# Patient Record
Sex: FEMALE | Race: WHITE | NOT HISPANIC OR LATINO | Employment: FULL TIME
[De-identification: names, ages, dates, MRNs, and addresses within clinical notes are randomized per-mention and may not be internally consistent; named-entity substitution may affect disease eponyms.]

---

## 2017-08-25 ENCOUNTER — RECORDS - HEALTHEAST (OUTPATIENT)
Dept: ADMINISTRATIVE | Facility: OTHER | Age: 57
End: 2017-08-25

## 2017-08-31 ENCOUNTER — COMMUNICATION - HEALTHEAST (OUTPATIENT)
Dept: TELEHEALTH | Facility: CLINIC | Age: 57
End: 2017-08-31

## 2017-08-31 ENCOUNTER — OFFICE VISIT - HEALTHEAST (OUTPATIENT)
Dept: FAMILY MEDICINE | Facility: CLINIC | Age: 57
End: 2017-08-31

## 2017-08-31 DIAGNOSIS — N95.2 ATROPHIC VAGINITIS: ICD-10-CM

## 2017-08-31 DIAGNOSIS — M85.9 LOW BONE DENSITY: ICD-10-CM

## 2017-08-31 DIAGNOSIS — R10.31 RIGHT LOWER QUADRANT ABDOMINAL PAIN: ICD-10-CM

## 2017-08-31 DIAGNOSIS — N81.9 VAGINAL VAULT PROLAPSE: ICD-10-CM

## 2017-08-31 DIAGNOSIS — Z00.00 HEALTH CARE MAINTENANCE: ICD-10-CM

## 2017-08-31 LAB
CHOLEST SERPL-MCNC: 240 MG/DL
FASTING STATUS PATIENT QL REPORTED: YES
HDLC SERPL-MCNC: 60 MG/DL
LDLC SERPL CALC-MCNC: 157 MG/DL
TRIGL SERPL-MCNC: 117 MG/DL

## 2017-08-31 RX ORDER — VIT A/VIT C/VIT E/ZINC/COPPER 7160-113
TABLET, DELAYED RELEASE (ENTERIC COATED) ORAL
Status: SHIPPED | COMMUNITY
Start: 2017-08-31 | End: 2024-07-30

## 2017-08-31 RX ORDER — DIPHENHYDRAMINE HCL 50 MG
50 CAPSULE ORAL EVERY 6 HOURS PRN
Status: SHIPPED | COMMUNITY
Start: 2017-08-31

## 2017-08-31 RX ORDER — MULTIVITAMIN
1 CAPSULE ORAL DAILY
Status: SHIPPED | COMMUNITY
Start: 2017-08-31 | End: 2024-07-30

## 2017-08-31 ASSESSMENT — MIFFLIN-ST. JEOR: SCORE: 1087.05

## 2017-09-01 ENCOUNTER — COMMUNICATION - HEALTHEAST (OUTPATIENT)
Dept: FAMILY MEDICINE | Facility: CLINIC | Age: 57
End: 2017-09-01

## 2017-09-01 ENCOUNTER — AMBULATORY - HEALTHEAST (OUTPATIENT)
Dept: FAMILY MEDICINE | Facility: CLINIC | Age: 57
End: 2017-09-01

## 2017-09-01 DIAGNOSIS — D58.2 ELEVATED HEMOGLOBIN (H): ICD-10-CM

## 2021-05-31 VITALS — WEIGHT: 123 LBS | HEIGHT: 63 IN | BODY MASS INDEX: 21.79 KG/M2

## 2021-06-12 NOTE — PROGRESS NOTES
Assessment/Plan:     1. Health care maintenance  Age appropriate health care screening and guidance discussed including nutrition, exercise, immunization updates and vitamin supplementation.   Screening labs and exams ordered below.   Up-to-date on Pap, mammogram and colonoscopy.  - Influenza, Seasonal,Quad Inj, 36+ MOS  - Hemoglobin  - Lipid Cascade FASTING  - Thyroid Wilmer  - Comprehensive Metabolic Panel  - Vitamin D, Total (25-Hydroxy)    2. Atrophic vaginitis  3. Vaginal vault prolapse  Discussed various options.  Patient would like to proceed with trial of estrogen creams.  Did discuss that if she plans to use and regulate her for long period of time would need to start on progesterone but if using sparingly once per week stay off for now.  Plan to continue mammograms regularly.  Did discuss referral to specialist surgical procedures at this point she declines.  - conjugated estrogens (PREMARIN) vaginal cream; 0.5 g PV daily for 2 weeks then once per week or as needed for symptomatic relief.  Dispense: 42.5 g; Refill: 3        4. Low bone density  Last done November 2016 we will continue to monitor.  Patient is taking calcium vitamin D we will check vitamin D and labs today.    5. Right lower quadrant abdominal pain  We will get labs as above.  Patient declines stool testing declines further imaging.  Will let us know if symptoms worsen or do not improve.  Recommended evaluating diet changes.  Recommended trying Pepto-Bismol.        Subjective:      Rowdy Urbano is a 57 y.o. female comes in today to establish care.  We reviewed past medical surgical social family history update the chart as necessary.  We will also able to obtain records via care everywhere from [facility].  Able to review past labs.  Patient had labs about a year ago which were fairly unremarkable.  She also had Pap smear last year which was normal.  Bone density showing mildly low bone mass.  Had last colonoscopy 2015 gets  endoscopies every 5 years due to history of polyps.  His past history of migraines but this has not recently been bothering her after menopause.  She is several years postmenopausal.  Last mammogram was just about 2 weeks ago we do not have results of that but I do have the results of last mammogram done a year ago.  Fasting for labs today.  Would like flu shot.  Has a few concerns.  She states that she believes she is getting some vaginal prolapse.  She feels pressure in the vaginal area sometimes feels like she can feel some tissue coming down.  [place] is somewhat painful she does not have any bleeding.  She does use K-Y jelly when she is intermittent is wondering what else she can use.  Has not used hormones in the past.  He does not have any changes with bladder or any incontinence  She also has some concerns for intermittent abdominal discomfort and cramping for the past 3 weeks she states no diarrhea it is just once a day but it is somewhat more loose she is having more gas.  She is feeling more pressure in the right lower abdomen but no significant pain also states that she felt nauseous just starting yesterday.  No diet or food changes.  She does have a history of a hemorrhoid and she states that it has been acting up a little bit with some bleeding but is not significant bleeding.  She does not find the blood mixed in with the stool and does think it is from the hemorrhoid.  She does not relate this to after eating.  Has not had any vomiting.  She does take probiotics.  No upper quadrant pain.    Current Outpatient Prescriptions   Medication Sig Dispense Refill     aspirin 81 MG EC tablet Take 81 mg by mouth daily.       calcium carbonate-vitamin D3 (CALCIUM 500 + D) 500 mg(1,250mg) -400 unit Tab Take by mouth. Calcium 1200mg       cholecalciferol, vitamin D3, (VITAMIN D3) 5,000 unit Tab Take by mouth.       conjugated estrogens (PREMARIN) vaginal cream 0.5 g PV daily for 2 weeks then once per week or  "as needed for symptomatic relief. 42.5 g 3     diphenhydrAMINE (BENADRYL) 50 MG capsule Take 50 mg by mouth every 6 (six) hours as needed for itching.       LACTOBACILLUS ACIDOPHILUS (PROBIOTIC ACIDOPHILUS ORAL) Take by mouth.       multivitamin capsule Take 1 capsule by mouth daily.       VIT A/VIT C/VIT E/ZINC/COPPER (ICAPS AREDS ORAL) Take by mouth.       No current facility-administered medications for this visit.      No Known Allergies  Past Medical History, Family History, and Social History reviewed.  History reviewed. No pertinent past medical history.  History reviewed. No pertinent surgical history.  Review of patient's allergies indicates no known allergies.  Family History   Problem Relation Age of Onset     Stroke Mother      Hyperlipidemia Mother      Cancer Mother      melanoma, basal cell     Pulmonary fibrosis Father      Cancer Father      kidney     Hyperlipidemia Maternal Grandfather      Heart disease Maternal Grandfather      Cancer Sister      basal cell     Social History     Social History     Marital status:      Spouse name: N/A     Number of children: N/A     Years of education: N/A     Occupational History     Not on file.     Social History Main Topics     Smoking status: Never Smoker     Smokeless tobacco: Never Used     Alcohol use Not on file     Drug use: No     Sexual activity: Not on file     Other Topics Concern     Not on file     Social History Narrative     No narrative on file         Review of systems is as stated in HPI, and the remainder of the 10 system review is otherwise negative.    Objective:     Vitals:    08/31/17 0944   BP: 122/66   Pulse: 74   SpO2: 98%   Weight: 123 lb (55.8 kg)   Height: 5' 3\" (1.6 m)    Body mass index is 21.79 kg/(m^2).  Wt Readings from Last 3 Encounters:   08/31/17 123 lb (55.8 kg)       General Appearance:    Alert, cooperative, no distress, appears stated age    Head:    Normocephalic, without obvious abnormality, atraumatic "   Eyes:    PERRL, EOM's intact, no conjunctivitis    Ears:    Normal TM's and external ear canals   Nose:   Mucosa normal, no drainage     or sinus tenderness   Throat:   Oropharynx is clear   Neck:   Supple, symmetrical, no adenopathy, no thyromegally, no carotid bruit        Lungs:     Clear to auscultation bilaterally, respirations unlabored   Chest Wall:    No tenderness or deformity, normal breast exam    Heart:    Regular rate and rhythm, S1 and S2 normal, no murmur, rub    or gallop       Abdomen:    There is mild fullness and tenderness in the right low to mid quadrant.  No significant rebound or rigidity or guarding, otherwise soft, non-tender, bowel sounds active all four quadrants,     no masses, no organomegaly, nonbleeding nonthrombosed hemorrhoid           Extremities:   Extremities normal, atraumatic, no cyanosis or edema   Pulses:   2+ and symmetric all extremities   Neuro:   cranial nerves grossly intact, grossly normal sensation and reflexes in extremities    Psych:   grossly normal mood and affect without acute anxiety or psychosis    Skin:   No rashes or lesions   Pelvic:  Uterus adnexa are small mobile nontender.  With Valsalva there is some sign of vaginal prolapse perhaps mild cystocele.  There is no inguinal lymphadenopathy.  There are signs of atrophic vaginitis.  No discharge or bleeding.         This note has been dictated using voice recognition software. Any grammatical or context distortions are unintentional and inherent to the software.

## 2021-06-16 PROBLEM — M85.9 LOW BONE DENSITY: Status: ACTIVE | Noted: 2017-08-31

## 2021-06-16 PROBLEM — N81.9 VAGINAL VAULT PROLAPSE: Status: ACTIVE | Noted: 2017-08-31

## 2021-06-16 PROBLEM — N95.2 ATROPHIC VAGINITIS: Status: ACTIVE | Noted: 2017-08-31

## 2024-07-30 ENCOUNTER — HOSPITAL ENCOUNTER (EMERGENCY)
Facility: HOSPITAL | Age: 64
Discharge: HOME | End: 2024-07-31
Attending: EMERGENCY MEDICINE | Admitting: EMERGENCY MEDICINE
Payer: COMMERCIAL

## 2024-07-30 ENCOUNTER — APPOINTMENT (OUTPATIENT)
Dept: MRI IMAGING | Facility: HOSPITAL | Age: 64
End: 2024-07-30
Attending: EMERGENCY MEDICINE
Payer: COMMERCIAL

## 2024-07-30 DIAGNOSIS — H53.9 VISUAL DISTURBANCE: ICD-10-CM

## 2024-07-30 LAB
ANION GAP SERPL CALCULATED.3IONS-SCNC: 12 MMOL/L (ref 7–15)
BASOPHILS # BLD AUTO: 0.1 10E3/UL (ref 0–0.2)
BASOPHILS NFR BLD AUTO: 1 %
BUN SERPL-MCNC: 19.9 MG/DL (ref 8–23)
CALCIUM SERPL-MCNC: 9.4 MG/DL (ref 8.8–10.4)
CHLORIDE SERPL-SCNC: 107 MMOL/L (ref 98–107)
CREAT SERPL-MCNC: 0.95 MG/DL (ref 0.51–0.95)
EGFRCR SERPLBLD CKD-EPI 2021: 66 ML/MIN/1.73M2
EOSINOPHIL # BLD AUTO: 0.3 10E3/UL (ref 0–0.7)
EOSINOPHIL NFR BLD AUTO: 4 %
ERYTHROCYTE [DISTWIDTH] IN BLOOD BY AUTOMATED COUNT: 12 % (ref 10–15)
GLUCOSE SERPL-MCNC: 107 MG/DL (ref 70–99)
HCO3 SERPL-SCNC: 22 MMOL/L (ref 22–29)
HCT VFR BLD AUTO: 44.2 % (ref 35–47)
HGB BLD-MCNC: 14.8 G/DL (ref 11.7–15.7)
IMM GRANULOCYTES # BLD: 0 10E3/UL
IMM GRANULOCYTES NFR BLD: 0 %
LYMPHOCYTES # BLD AUTO: 2.3 10E3/UL (ref 0.8–5.3)
LYMPHOCYTES NFR BLD AUTO: 33 %
MCH RBC QN AUTO: 29.2 PG (ref 26.5–33)
MCHC RBC AUTO-ENTMCNC: 33.5 G/DL (ref 31.5–36.5)
MCV RBC AUTO: 87 FL (ref 78–100)
MONOCYTES # BLD AUTO: 0.7 10E3/UL (ref 0–1.3)
MONOCYTES NFR BLD AUTO: 11 %
NEUTROPHILS # BLD AUTO: 3.6 10E3/UL (ref 1.6–8.3)
NEUTROPHILS NFR BLD AUTO: 52 %
NRBC # BLD AUTO: 0 10E3/UL
NRBC BLD AUTO-RTO: 0 /100
PLATELET # BLD AUTO: 234 10E3/UL (ref 150–450)
POTASSIUM SERPL-SCNC: 3.8 MMOL/L (ref 3.4–5.3)
RBC # BLD AUTO: 5.06 10E6/UL (ref 3.8–5.2)
SODIUM SERPL-SCNC: 141 MMOL/L (ref 135–145)
TROPONIN T SERPL HS-MCNC: 7 NG/L
WBC # BLD AUTO: 7 10E3/UL (ref 4–11)

## 2024-07-30 PROCEDURE — 85025 COMPLETE CBC W/AUTO DIFF WBC: CPT | Performed by: EMERGENCY MEDICINE

## 2024-07-30 PROCEDURE — 70549 MR ANGIOGRAPH NECK W/O&W/DYE: CPT

## 2024-07-30 PROCEDURE — 84484 ASSAY OF TROPONIN QUANT: CPT | Performed by: EMERGENCY MEDICINE

## 2024-07-30 PROCEDURE — 36415 COLL VENOUS BLD VENIPUNCTURE: CPT | Performed by: EMERGENCY MEDICINE

## 2024-07-30 PROCEDURE — 70544 MR ANGIOGRAPHY HEAD W/O DYE: CPT | Mod: XU

## 2024-07-30 PROCEDURE — A9585 GADOBUTROL INJECTION: HCPCS | Performed by: EMERGENCY MEDICINE

## 2024-07-30 PROCEDURE — 99285 EMERGENCY DEPT VISIT HI MDM: CPT | Mod: 25

## 2024-07-30 PROCEDURE — 93005 ELECTROCARDIOGRAM TRACING: CPT | Performed by: EMERGENCY MEDICINE

## 2024-07-30 PROCEDURE — 80048 BASIC METABOLIC PNL TOTAL CA: CPT | Performed by: EMERGENCY MEDICINE

## 2024-07-30 PROCEDURE — 255N000002 HC RX 255 OP 636: Performed by: EMERGENCY MEDICINE

## 2024-07-30 PROCEDURE — 70553 MRI BRAIN STEM W/O & W/DYE: CPT

## 2024-07-30 RX ORDER — GADOBUTROL 604.72 MG/ML
6 INJECTION INTRAVENOUS ONCE
Status: COMPLETED | OUTPATIENT
Start: 2024-07-30 | End: 2024-07-30

## 2024-07-30 RX ORDER — CHLORAL HYDRATE 500 MG
1 CAPSULE ORAL EVERY MORNING
COMMUNITY

## 2024-07-30 RX ADMIN — GADOBUTROL 6 ML: 604.72 INJECTION INTRAVENOUS at 22:58

## 2024-07-30 ASSESSMENT — ENCOUNTER SYMPTOMS
DIZZINESS: 0
SPEECH DIFFICULTY: 0
WEAKNESS: 0

## 2024-07-30 ASSESSMENT — COLUMBIA-SUICIDE SEVERITY RATING SCALE - C-SSRS
6. HAVE YOU EVER DONE ANYTHING, STARTED TO DO ANYTHING, OR PREPARED TO DO ANYTHING TO END YOUR LIFE?: NO
2. HAVE YOU ACTUALLY HAD ANY THOUGHTS OF KILLING YOURSELF IN THE PAST MONTH?: NO
1. IN THE PAST MONTH, HAVE YOU WISHED YOU WERE DEAD OR WISHED YOU COULD GO TO SLEEP AND NOT WAKE UP?: NO

## 2024-07-30 ASSESSMENT — ACTIVITIES OF DAILY LIVING (ADL): ADLS_ACUITY_SCORE: 35

## 2024-07-31 ENCOUNTER — ANCILLARY PROCEDURE (OUTPATIENT)
Dept: ULTRASOUND IMAGING | Facility: HOSPITAL | Age: 64
End: 2024-07-31
Attending: EMERGENCY MEDICINE
Payer: COMMERCIAL

## 2024-07-31 VITALS
HEART RATE: 65 BPM | WEIGHT: 121 LBS | DIASTOLIC BLOOD PRESSURE: 73 MMHG | HEIGHT: 63 IN | TEMPERATURE: 98.1 F | RESPIRATION RATE: 18 BRPM | BODY MASS INDEX: 21.44 KG/M2 | SYSTOLIC BLOOD PRESSURE: 115 MMHG | OXYGEN SATURATION: 97 %

## 2024-07-31 ASSESSMENT — ACTIVITIES OF DAILY LIVING (ADL): ADLS_ACUITY_SCORE: 35

## 2024-07-31 NOTE — ED PROVIDER NOTES
NAME: Rowdy Urbano  AGE: 63 year old female  YOB: 1960  MRN: [id number]  EVALUATION DATE & TIME: 7/30/2024 10:03 PM    PCP: Bell Leigh    ED PROVIDER: Jostin Camp M.D.      Chief Complaint   Patient presents with    Eye Problem         FINAL IMPRESSION:  1. Visual disturbance        MEDICAL DECISION MAKING:    10:05 PM Patient was clinically assessed and consented to treatment. After assessment, medical decision making and workup were discussed with the patient. The patient was agreeable to plan for testing, workup, and treatment.  Pertinent Labs & Imaging studies reviewed. (See chart for details)  12:28 AM I updated the patient on their results. Will perform a bedside ultrasound.   1:16 AM I spoke with Dr. Martínez, Ophthalmology at [facility].  1:21 AM I updated the patient regarding their work-up findings. I also discussed discharge and the patient is agreeable. Reviewed supportive cares, symptomatic treatment, outpatient follow up, and reasons to return to the Emergency Department. All questions and concerns were addressed. Patient to be discharged by ED RN.           Medical Decision Making  Obtained supplemental history:Supplemental history obtained?: No  Reviewed external records: External records reviewed?: Documented in chart  Care impacted by chronic illness:N/A  Care significantly affected by social determinants of health:Access to Medical Care  Did you consider but not order tests?: Work up considered but not performed and documented in chart, if applicable  Did you interpret images independently?: Independent interpretation of ECG and images noted in documentation, when applicable.  Consultation discussion with other provider:Did you involve another provider (consultant, , pharmacy, etc.)?: I discussed the care with another health care provider, see documentation for details.  Discharge. No recommendations on prescription strength medication(s). See  documentation for any additional details.    Rowdy Urbano is a 63 year old female who presents with eye problem.   Differential diagnosis includes but not limited to anatomic artery occlusion, CVA, TIA, retinal detachment, vitreous floaters, retinal hematoma.  Patient is a 63-year-old female who had onset of symptoms about 5:30 PM of blurry vision which then resulted in development of a right lateral visual field deficit that is black with flashing on the edges.  It is only in the far peripheral aspect of the field but asymmetric to the left side and medial edge on the right.  Patient has no unilateral deficits neurologic examination, no facial asymmetry, no neck tenderness, no acute findings of neurologic deficit.  On examination of the I do not see any papilledema or vitreous hemorrhage.  Patient is not painful and unlikely be ophthalmic artery occlusion as this is usually a painful loss of vision.  Possibly retinal detachment however patient does have family history of stroke.  To best evaluate the ophthalmic artery patient would not require MRI and this was ordered.  Labs were otherwise unremarkable including EKG and troponin.  Patient was sent for MRI which showed no arterial abnormality or occlusion.  I discussed MRI results with the patient and then also performed bedside ultrasound of the orbit and did not see any obvious retinal detachment.  Given the field deficit had improved slightly but was still there I would recommend follow-up with ophthalmology.  Patient does see [facility], Dr. Adelia Brown.  I did page the on-call number and spoke with one of her colleagues.  She will take down patient's name and they will try to get her into clinic later today.  Patient is to call her office first thing in the morning to try to get appointment for full assessment with one of their ophthalmologist in the right clinic later today.  Patient comfortable with this plan after discussion we will  follow-up with ophthalmology for full assessment and further treatment of the visual field deficit or disturbance.    0 minutes of critical care time    MEDICATIONS GIVEN IN THE EMERGENCY:  Medications   gadobutrol (GADAVIST) injection 6 mL (6 mLs Intravenous $Given 7/30/24 5213)       NEW PRESCRIPTIONS STARTED AT TODAY'S ER VISIT:  Discharge Medication List as of 7/31/2024  1:22 AM             =================================================================    HPI    Patient information was obtained from: The patient    Use of : N/A       Rowdy Urbano is a 63 year old female with a past medical history of migraine headache, who presents with eye problem.    The patient developed an sudden onset of blurry vision to the right bottom corner of her right vision around 5:30 PM today accompanied with flashing lights on the outer vision. She is nauseous at this time. She was able to walk from her car to the ER. No personal history of stroke but her mother had a stroke. She does have care established with an Ophthalmology at [facility]. No complaints of dizziness, weakness, eye pain, speech difficulty, and any other medical complaints or concerns at this time.      REVIEW OF SYSTEMS   Review of Systems   Eyes:  Positive for visual disturbance (blurred vision and flashing lights to right eye).   Neurological:  Negative for dizziness, speech difficulty and weakness.   All other systems reviewed and are negative.       PAST MEDICAL HISTORY:  No past medical history on file.    PAST SURGICAL HISTORY:  No past surgical history on file.    CURRENT MEDICATIONS:    No current facility-administered medications for this encounter.    Current Outpatient Medications:     calcium carbonate (CALCIUM 600) 1500 (600 Ca) MG tablet, Take 1,200 mg by mouth every morning, Disp: , Rfl:     cholecalciferol, vitamin D3, (VITAMIN D3) 5,000 unit Tab, Take 1 tablet by mouth every morning, Disp: , Rfl:     diphenhydrAMINE  (BENADRYL) 50 MG capsule, [DIPHENHYDRAMINE (BENADRYL) 50 MG CAPSULE] Take 50 mg by mouth every 6 (six) hours as needed for itching., Disp: , Rfl:     fish oil-omega-3 fatty acids 1000 MG capsule, Take 1 g by mouth every morning, Disp: , Rfl:     Multiple Vitamins-Minerals (ICAPS AREDS 2 PO), Take 1 capsule by mouth 2 times daily, Disp: , Rfl:     ALLERGIES:  No Known Allergies    FAMILY HISTORY:  Family History   Problem Relation Age of Onset    Cerebrovascular Disease Mother     Hyperlipidemia Mother     Cancer Mother         melanoma, basal cell    Pulmonary fibrosis Father     Cancer Father         kidney    Hyperlipidemia Maternal Grandfather     Heart Disease Maternal Grandfather     Cancer Sister         basal cell       SOCIAL HISTORY:   Social History     Socioeconomic History    Marital status:    Tobacco Use    Smoking status: Never    Smokeless tobacco: Never   Substance and Sexual Activity    Drug use: No     Social Determinants of Health     Financial Resource Strain: Low Risk  (1/14/2024)    Received from [facility]    Financial Resource Strain     Difficulty of Paying Living Expenses: 3   Food Insecurity: No Food Insecurity (1/14/2024)    Received from [facility]    Food Insecurity     Worried About Running Out of Food in the Last Year: 1   Transportation Needs: No Transportation Needs (1/14/2024)    Received from [facility]    Transportation Needs     Lack of Transportation (Medical): 1   Social Connections: Socially Integrated (1/14/2024)    Received from [facility]    Social Connections     Frequency of Communication with Friends and Family: 0   Housing Stability: Low Risk  (1/14/2024)    Received from [facility]    Housing Stability     Unable to Pay for Housing in the Last Year: 1       PHYSICAL EXAM:    Vitals: /73   " Pulse 65   Temp 98.1  F (36.7  C)   Resp 18   Ht 1.6 m (5' 3\")   Wt 54.9 kg (121 lb)   SpO2 97%   BMI 21.43 kg/m     Physical Exam  Vitals and nursing note reviewed.   Constitutional:       General: She is not in acute distress.     Appearance: Normal appearance. She is normal weight. She is not ill-appearing or toxic-appearing.   HENT:      Head: Normocephalic.   Eyes:      General: Lids are normal.      Extraocular Movements: Extraocular movements intact.      Right eye: Normal extraocular motion and no nystagmus.      Left eye: Normal extraocular motion and no nystagmus.      Conjunctiva/sclera: Conjunctivae normal.      Pupils: Pupils are equal, round, and reactive to light.      Funduscopic exam:     Right eye: No hemorrhage or papilledema.         Left eye: No hemorrhage or papilledema.      Slit lamp exam:     Right eye: No photophobia.      Left eye: No photophobia.      Visual Fields:      Right eye: CF in the upper temporal quadrant. CF in the upper nasal quadrant. CF in the lower temporal quadrant. CF in the lower nasal quadrant.      Left eye: CF in the upper nasal quadrant. CF in the upper temporal quadrant. CF in the lower nasal quadrant. CF in the lower temporal quadrant.   Cardiovascular:      Rate and Rhythm: Normal rate and regular rhythm.      Heart sounds: Normal heart sounds.   Pulmonary:      Effort: Pulmonary effort is normal. No respiratory distress.      Breath sounds: Normal breath sounds.   Musculoskeletal:      Cervical back: Normal range of motion.   Skin:     General: Skin is warm and dry.   Neurological:      Mental Status: She is alert and oriented to person, place, and time.      Sensory: No sensory deficit.      Motor: No weakness.      Coordination: Coordination normal.      Gait: Gait normal.   Psychiatric:         Behavior: Behavior normal.        LAB:  All pertinent labs reviewed and interpreted.  Labs Ordered and Resulted from Time of ED Arrival to Time of ED Departure "   BASIC METABOLIC PANEL - Abnormal       Result Value    Sodium 141      Potassium 3.8      Chloride 107      Carbon Dioxide (CO2) 22      Anion Gap 12      Urea Nitrogen 19.9      Creatinine 0.95      GFR Estimate 66      Calcium 9.4      Glucose 107 (*)    TROPONIN T, HIGH SENSITIVITY - Normal    Troponin T, High Sensitivity 7     CBC WITH PLATELETS AND DIFFERENTIAL    WBC Count 7.0      RBC Count 5.06      Hemoglobin 14.8      Hematocrit 44.2      MCV 87      MCH 29.2      MCHC 33.5      RDW 12.0      Platelet Count 234      % Neutrophils 52      % Lymphocytes 33      % Monocytes 11      % Eosinophils 4      % Basophils 1      % Immature Granulocytes 0      NRBCs per 100 WBC 0      Absolute Neutrophils 3.6      Absolute Lymphocytes 2.3      Absolute Monocytes 0.7      Absolute Eosinophils 0.3      Absolute Basophils 0.1      Absolute Immature Granulocytes 0.0      Absolute NRBCs 0.0         RADIOLOGY:  POC US SOFT TISSUE   ED Interpretation   PROCEDURE: Emergency Department Limited Bedside Screening Ultrasound of the Eye  INDICATIONS: Right eye visual field deficit  PROCEDURE PROVIDER:   Nehemias Wall   WINDOW: Right eye  FINDINGS: No evidence of abnormal lens alignment or lie.  No obvious hyperechoic linear retinal abnormality or detachment seen.  IMAGES SAVED AND STORED FOR ARCHIVE AND REVIEW: Yes            MR Brain w/o & w Contrast   Final Result   IMPRESSION:   1.  No acute infarct.      2.  Minimal age-related changes described above.      MRA Angiogram Head w/o Contrast   Final Result   IMPRESSION:   1.  No significant stenosis or occlusion.      MRA Angiogram Neck w/o & w Contrast   Final Result   IMPRESSION:   1.  Left vertebral artery origin and left V1 segment suboptimally evaluated.      2.  Otherwise, no significant stenosis or occlusion.      3.  No cervical artery dissection.          EKG:   Performed at: 07/30/2024 at 22:24  Impression: Sinus rhythm, normal EKG, no significant ST elevation ischemia  or irregular rhythm  Rate: 75 bpm  Rhythm: Sinus Rhythm  QRS Interval: 84 ms  QTc Interval: 410 ms  Comparison: No previous ECGs available  I have independently reviewed and interpreted the EKG(s) documented above.     PROCEDURES:   Procedures         I, Amandeep Storm, am serving as a scribe to document services personally performed by Dr. Jostin Camp  based on my observation and the provider's statements to me. I, Jostin Camp MD attest that Amandeep Storm is acting in a scribe capacity, has observed my performance of the services and has documented them in accordance with my direction.      Jostin Camp M.D.  Emergency Medicine  [facility] Emergency Department       Jostin Camp MD  07/31/24 0670

## 2024-07-31 NOTE — ED TRIAGE NOTES
"Patient reports that she first noted vision changes to her right eye that started at 1700.  States had \"a giant floater- like I had a dog hair in my eye.\"  States that approximately around 2115 she noted that she started to have a \"ring of light\" around her right eye and felt that when she was reading on her phone \"it looked like it was smudged.\"  Denies any pain.  Denies any hx of stroke.  Denies any unilateral numbness/weakness.  Speech is clear.  Asked for provider neuro exam in triage- no stroke code at this time.      Triage Assessment (Adult)       Row Name 07/30/24 2202          Triage Assessment    Airway WDL WDL        Respiratory WDL    Respiratory WDL WDL        Skin Circulation/Temperature WDL    Skin Circulation/Temperature WDL WDL        Cardiac WDL    Cardiac WDL WDL        Peripheral/Neurovascular WDL    Peripheral Neurovascular WDL WDL        Cognitive/Neuro/Behavioral WDL    Cognitive/Neuro/Behavioral WDL WDL                     "

## 2024-07-31 NOTE — MEDICATION SCRIBE - ADMISSION MEDICATION HISTORY
Medication Scribe Admission Medication History    Admission medication history is complete. The information provided in this note is only as accurate as the sources available at the time of the update.    Information Source(s): Patient via in-person    Pertinent Information: pt manages her own medications. Pt takes no prescription medications, only OTC meds.    Changes made to PTA medication list:  Added: ARED 2, Fish oil  Deleted: Aspirin, Lactobacillus, Multivita,Icaps,  Changed: Calcium+D to Calcium 1200 and Vit D 5000unit,    Allergies reviewed with patient and updates made in EHR: yes    Medication History Completed By: Saeed Garcia 7/30/2024 11:39 PM    PTA Med List   Medication Sig Last Dose    calcium carbonate (CALCIUM 600) 1500 (600 Ca) MG tablet Take 1,200 mg by mouth every morning 7/30/2024 at AM    cholecalciferol, vitamin D3, (VITAMIN D3) 5,000 unit Tab Take 1 tablet by mouth every morning 7/30/2024 at AM    diphenhydrAMINE (BENADRYL) 50 MG capsule [DIPHENHYDRAMINE (BENADRYL) 50 MG CAPSULE] Take 50 mg by mouth every 6 (six) hours as needed for itching. Past Month at PRN    fish oil-omega-3 fatty acids 1000 MG capsule Take 1 g by mouth every morning 7/30/2024 at AM    Multiple Vitamins-Minerals (ICAPS AREDS 2 PO) Take 1 capsule by mouth 2 times daily 7/30/2024 at PM

## 2024-08-12 LAB
ATRIAL RATE - MUSE: 75 BPM
DIASTOLIC BLOOD PRESSURE - MUSE: NORMAL MMHG
INTERPRETATION ECG - MUSE: NORMAL
P AXIS - MUSE: 64 DEGREES
PR INTERVAL - MUSE: 148 MS
QRS DURATION - MUSE: 84 MS
QT - MUSE: 368 MS
QTC - MUSE: 410 MS
R AXIS - MUSE: 67 DEGREES
SYSTOLIC BLOOD PRESSURE - MUSE: NORMAL MMHG
T AXIS - MUSE: 45 DEGREES
VENTRICULAR RATE- MUSE: 75 BPM

## 2025-02-15 ENCOUNTER — HEALTH MAINTENANCE LETTER (OUTPATIENT)
Age: 65
End: 2025-02-15